# Patient Record
Sex: FEMALE | Race: WHITE
[De-identification: names, ages, dates, MRNs, and addresses within clinical notes are randomized per-mention and may not be internally consistent; named-entity substitution may affect disease eponyms.]

---

## 2018-03-24 ENCOUNTER — HOSPITAL ENCOUNTER (OUTPATIENT)
Dept: HOSPITAL 76 - DI | Age: 43
Discharge: HOME | End: 2018-03-24
Attending: NURSE PRACTITIONER
Payer: OTHER GOVERNMENT

## 2018-03-24 DIAGNOSIS — R10.11: Primary | ICD-10-CM

## 2018-03-24 DIAGNOSIS — Z90.49: ICD-10-CM

## 2018-03-24 PROCEDURE — 76705 ECHO EXAM OF ABDOMEN: CPT

## 2018-03-25 NOTE — ULTRASOUND REPORT
EXAM:

ABDOMEN ULTRASOUND LIMITED, RUQ

 

EXAM DATE: 3/24/2018 09:06 AM.

 

CLINICAL HISTORY: Intermittent right upper quadrant pain x 6-12 months.

 

COMPARISON: 12/12/2008.

 

TECHNIQUE: Real-time scanning was performed with static images obtained. 

 

FINDINGS: 

Liver: Mildly heterogeneous liver parenchyma. No mass or intrahepatic bile duct dilation is noted. 17
.4 cm. Main portal vein flow: Hepatopetal.

 

Gallbladder: Surgically absent. 

 

Biliary System: CBD measures 6.8 mm. No intrahepatic or extrahepatic ductal dilatation. 

 

Other: Right kidney measures 10.9 cm in length. No hydronephrosis. 

 

IMPRESSION: 

1. Gallbladder is surgically absent. Normal common bile duct. 

2. Mildly heterogeneous and coarse liver parenchyma could represent fatty infiltration. No mass.

 

[facility]

Referring Provider Line: [phone]

 

SITE ID: [id number]

## 2018-08-07 ENCOUNTER — HOSPITAL ENCOUNTER (EMERGENCY)
Dept: HOSPITAL 76 - ED | Age: 43
Discharge: HOME | End: 2018-08-07
Payer: OTHER GOVERNMENT

## 2018-08-07 VITALS — DIASTOLIC BLOOD PRESSURE: 84 MMHG | SYSTOLIC BLOOD PRESSURE: 127 MMHG

## 2018-08-07 DIAGNOSIS — R10.11: Primary | ICD-10-CM

## 2018-08-07 LAB
ALBUMIN DIAFP-MCNC: 4 G/DL (ref 3.2–5.5)
ALBUMIN/GLOB SERPL: 1.3 {RATIO} (ref 1–2.2)
ALP SERPL-CCNC: 75 IU/L (ref 42–121)
ALT SERPL W P-5'-P-CCNC: 29 IU/L (ref 10–60)
ANION GAP SERPL CALCULATED.4IONS-SCNC: 6 MMOL/L (ref 6–13)
AST SERPL W P-5'-P-CCNC: 24 IU/L (ref 10–42)
BASOPHILS NFR BLD AUTO: 0.1 10^3/UL (ref 0–0.1)
BASOPHILS NFR BLD AUTO: 0.8 %
BILIRUB BLD-MCNC: 0.7 MG/DL (ref 0.2–1)
BUN SERPL-MCNC: 12 MG/DL (ref 6–20)
CALCIUM UR-MCNC: 9 MG/DL (ref 8.5–10.3)
CHLORIDE SERPL-SCNC: 107 MMOL/L (ref 101–111)
CLARITY UR REFRACT.AUTO: CLEAR
CO2 SERPL-SCNC: 24 MMOL/L (ref 21–32)
CREAT SERPLBLD-SCNC: 0.7 MG/DL (ref 0.4–1)
EOSINOPHIL # BLD AUTO: 0.2 10^3/UL (ref 0–0.7)
EOSINOPHIL NFR BLD AUTO: 2.3 %
ERYTHROCYTE [DISTWIDTH] IN BLOOD BY AUTOMATED COUNT: 14.1 % (ref 12–15)
GFRSERPLBLD MDRD-ARVRAT: 91 ML/MIN/{1.73_M2} (ref 89–?)
GLOBULIN SER-MCNC: 3 G/DL (ref 2.1–4.2)
GLUCOSE SERPL-MCNC: 91 MG/DL (ref 70–100)
GLUCOSE UR QL STRIP.AUTO: NEGATIVE MG/DL
HGB UR QL STRIP: 12.3 G/DL (ref 12–16)
KETONES UR QL STRIP.AUTO: NEGATIVE MG/DL
LIPASE SERPL-CCNC: 31 U/L (ref 22–51)
LYMPHOCYTES # SPEC AUTO: 1.9 10^3/UL (ref 1.5–3.5)
LYMPHOCYTES NFR BLD AUTO: 27.7 %
MCH RBC QN AUTO: 27.2 PG (ref 27–31)
MCHC RBC AUTO-ENTMCNC: 33 G/DL (ref 32–36)
MCV RBC AUTO: 82.4 FL (ref 81–99)
MONOCYTES # BLD AUTO: 0.5 10^3/UL (ref 0–1)
MONOCYTES NFR BLD AUTO: 6.9 %
NEUTROPHILS # BLD AUTO: 4.2 10^3/UL (ref 1.5–6.6)
NEUTROPHILS # SNV AUTO: 6.7 X10^3/UL (ref 4.8–10.8)
NEUTROPHILS NFR BLD AUTO: 62.3 %
NITRITE UR QL STRIP.AUTO: NEGATIVE
PDW BLD AUTO: 6.6 FL (ref 7.9–10.8)
PH UR STRIP.AUTO: 6 PH (ref 5–7.5)
PLATELET # BLD: 310 10^3/UL (ref 130–450)
PROT SPEC-MCNC: 7 G/DL (ref 6.7–8.2)
PROT UR STRIP.AUTO-MCNC: NEGATIVE MG/DL
RBC # UR STRIP.AUTO: NEGATIVE /UL
RBC MAR: 4.53 10^6/UL (ref 4.2–5.4)
SODIUM SERPLBLD-SCNC: 137 MMOL/L (ref 135–145)
SP GR UR STRIP.AUTO: 1.01 (ref 1–1.03)
UROBILINOGEN UR QL STRIP.AUTO: (no result) E.U./DL
UROBILINOGEN UR STRIP.AUTO-MCNC: NEGATIVE MG/DL

## 2018-08-07 PROCEDURE — 80053 COMPREHEN METABOLIC PANEL: CPT

## 2018-08-07 PROCEDURE — 36415 COLL VENOUS BLD VENIPUNCTURE: CPT

## 2018-08-07 PROCEDURE — 85025 COMPLETE CBC W/AUTO DIFF WBC: CPT

## 2018-08-07 PROCEDURE — 87086 URINE CULTURE/COLONY COUNT: CPT

## 2018-08-07 PROCEDURE — 81003 URINALYSIS AUTO W/O SCOPE: CPT

## 2018-08-07 PROCEDURE — 83690 ASSAY OF LIPASE: CPT

## 2018-08-07 PROCEDURE — 81001 URINALYSIS AUTO W/SCOPE: CPT

## 2018-08-07 PROCEDURE — 99283 EMERGENCY DEPT VISIT LOW MDM: CPT

## 2018-08-07 NOTE — ED PHYSICIAN DOCUMENTATION
PD HPI ABD PAIN





- Stated complaint


Stated Complaint: ABD PX





- Chief complaint


Chief Complaint: Abd Pain





- History obtained from


History obtained from: Patient





- History of Present Illness


Timing - onset: How many days ago (2)


Timing - duration: Days (2)


Timing - details: Gradual onset (she had tripped and twisted while carrying 

moderately heavy object earlier and then noted pain upper abd in evening. The 

pain has persisted. Not hurting to eat. No change with antacid. Pain worse with 

movement and palpation. Had CCY in past and concerned about bile duct blockage 

or pancreatic problem.), Still present


Quality: Aching, Dull, Pain.  No: Cramping


Location: RUQ, Epigastric


Improved by: Laying still.  No: Eating


Worsened by: Moving, Breathing, Palpation.  No: Eating


Associated symptoms: No: Fever, Nausea, Vomiting, Diarrhea, Chest pain, Near 

syncope / syncope


Similar symptoms before: Diagnosis (feels similar to gallbladder pains from the 

past)


Recently seen: Not recently seen





Review of Systems


Constitutional: denies: Fever, Chills


Nose: denies: Rhinorrhea / runny nose, Congestion


Throat: denies: Sore throat


Cardiac: denies: Chest pain / pressure


Respiratory: denies: Cough


GI: reports: Abdominal Pain.  denies: Nausea, Vomiting, Diarrhea


: denies: Dysuria, Frequency


Skin: denies: Rash, Lesions


Musculoskeletal: denies: Neck pain, Back pain





PD PAST MEDICAL HISTORY





- Past Medical History


Respiratory: Asthma


Derm: Eczema





- Past Surgical History


Past Surgical History: Yes


General: Cholecystectomy


/GYN:  section, Tubal ligation





- Present Medications


Home Medications: 


 Ambulatory Orders











 Medication  Instructions  Recorded  Confirmed


 


Albuterol Sulfate 1.25 mg IH PRN 08/14/13 06/09/15


 


Fluticasone 44 Mcg [Flovent] 2 puffs INH BID 08/14/13 06/09/15


 


HYDROcod/ACETAM 5/325 [Vicodin 1 - 2 ea PO Q6H PRN #20 tablet 08/14/13 06/09/15





5/325]   


 


Azithromycin [Zithromax] 250 mg PO DAILY #6 tablet 06/09/15 


 


Ipratropium/Albuterol [Duoneb] 3 ml INH Q6H PRN #30 units 06/09/15 


 


Loratadine [Claritin] 1 tab DAILY 06/09/15 06/09/15


 


guaiFENesin [Mucinex] 600 mg PRN 06/09/15 06/09/15


 


predniSONE [Deltasone] 60 mg PO DAILY 06/09/15 06/09/15














- Allergies


Allergies/Adverse Reactions: 


 Allergies











Allergy/AdvReac Type Severity Reaction Status Date / Time


 


Iodinated Contrast- Oral and Allergy Severe seizure Verified 18 13:39





IV Dye     





[Iodinated Contrast Media -     





IV Dye]     


 


NSAIDS (Non-Steroidal Allergy Severe Respiratory Verified 18 13:39





Anti-Inflamma     


 


Sulfa (Sulfonamide Allergy Unknown unknown Verified 18 13:39





Antibiotics)     


 


Penicillins AdvReac Intermediate "get Verified 18 13:39





   really  





   sick"  


 


latex AdvReac Mild blister Verified 18 13:39














- Social History


Does the pt smoke?: No


Smoking Status: Never smoker


Does the pt drink ETOH?: No


Does the pt have substance abuse?: No





PD ED PE NORMAL





- Vitals


Vital signs reviewed: Yes





- General


General: Alert and oriented X 3, No acute distress, Well developed/nourished





- HEENT


HEENT: Pharynx benign





- Neck


Neck: Supple, no meningeal sign, No adenopathy





- Cardiac


Cardiac: RRR, No murmur





- Respiratory


Respiratory: Clear bilaterally





- Abdomen


Abdomen: Normal bowel sounds, Soft, Non distended, No organomegaly, Other (

tender RUQ and epigastric without guarding nor percussion tenderness. )





- Female 


Female : Deferred





- Rectal


Rectal: Deferred





- Back


Back: No CVA TTP





- Derm


Derm: Normal color, Warm and dry





- Extremities


Extremities: No tenderness to palpate, Normal ROM s pain, No edema, No calf 

tenderness / cord





- Neuro


Neuro: Alert and oriented X 3, No motor deficit, Normal speech





Results





- Vitals


Vitals: 


 Vital Signs - 24 hr











  18





  13:36 15:35 17:27


 


Temperature 36.9 C 37.0 C 


 


Heart Rate 90 89 90


 


Respiratory 20 20 16





Rate   


 


Blood Pressure 124/80 121/75 127/84 H


 


O2 Saturation 98 87 L 98








 Oxygen











O2 Source                      Room air

















- Labs


Labs: 


 Laboratory Tests











  18





  14:30 15:19 15:19


 


WBC   6.7 


 


RBC   4.53 


 


Hgb   12.3 


 


Hct   37.3 


 


MCV   82.4 


 


MCH   27.2 


 


MCHC   33.0 


 


RDW   14.1 


 


Plt Count   310 


 


MPV   6.6 L 


 


Neut # (Auto)   4.2 


 


Lymph # (Auto)   1.9 


 


Mono # (Auto)   0.5 


 


Eos # (Auto)   0.2 


 


Baso # (Auto)   0.1 


 


Absolute Nucleated RBC   0.00 


 


Nucleated RBC %   0.0 


 


Sodium    137


 


Potassium    4.0


 


Chloride    107


 


Carbon Dioxide    24


 


Anion Gap    6.0


 


BUN    12


 


Creatinine    0.7


 


Estimated GFR (MDRD)    91


 


Glucose    91


 


Calcium    9.0


 


Total Bilirubin    0.7


 


AST    24


 


ALT    29


 


Alkaline Phosphatase    75


 


Total Protein    7.0


 


Albumin    4.0


 


Globulin    3.0


 


Albumin/Globulin Ratio    1.3


 


Lipase    31


 


Urine Color  YELLOW  


 


Urine Clarity  CLEAR  


 


Urine pH  6.0  


 


Ur Specific Gravity  1.015  


 


Urine Protein  NEGATIVE  


 


Urine Glucose (UA)  NEGATIVE  


 


Urine Ketones  NEGATIVE  


 


Urine Occult Blood  NEGATIVE  


 


Urine Nitrite  NEGATIVE  


 


Urine Bilirubin  NEGATIVE  


 


Urine Urobilinogen  0.2 (NORMAL)  


 


Ur Leukocyte Esterase  NEGATIVE  


 


Ur Microscopic Review  NOT INDICATED  


 


Urine Culture Comments  NOT INDICATED  














Procedures





- Bedside sono


Bedside sono by EMP: 





Gallbladder sulcus without gallbladder c/w history. CBD found and measures 

3.7mm. No free fluid seen. 





PD MEDICAL DECISION MAKING





- ED course


Complexity details: reviewed results, considered differential (labs good and 

bedside U/S showing normal CBD. She has components of the pain that seem 

myofascial. Consider some prior adhesions causing pain/etc. ), d/w patient





- Sepsis Event


Vital Signs: 


 Vital Signs - 24 hr











  18





  13:36 15:35 17:27


 


Temperature 36.9 C 37.0 C 


 


Heart Rate 90 89 90


 


Respiratory 20 20 16





Rate   


 


Blood Pressure 124/80 121/75 127/84 H


 


O2 Saturation 98 87 L 98








 Oxygen











O2 Source                      Room air

















Departure





- Departure


Disposition: 01 Home, Self Care


Clinical Impression: 


Abdominal pain


Qualifiers:


 Abdominal location: right upper quadrant Qualified Code(s): R10.11 - Right 

upper quadrant pain





Condition: Stable


Record reviewed to determine appropriate education?: Yes


Instructions:  ED Abdominal Pain Unkn Cause


Follow-Up: 


Jackie Enrique ARNP [Primary Care Provider] - 


Comments: 


Drink lots of fluids.  Use some ibuprofen or naproxen to 3 times daily and add 

Tylenol if needed for pains. 





Right now there is no signs of significant cause of the pain.  Your blood tests 

are normal.  Your common bile duct appears normal size.  It does not sound like 

ulcer.  This may be presumably myofascial and see if it improves over a few a 

few days with some anti-inflammatories.  Activity as able.  





Recheck if not better over the next few days or if other symptoms develop.


Discharge Date/Time: 18 17:27

## 2019-08-29 ENCOUNTER — HOSPITAL ENCOUNTER (OUTPATIENT)
Dept: HOSPITAL 76 - DI | Age: 44
Discharge: HOME | End: 2019-08-29
Attending: NURSE PRACTITIONER
Payer: OTHER GOVERNMENT

## 2019-08-29 DIAGNOSIS — R10.32: Primary | ICD-10-CM

## 2019-08-29 PROCEDURE — 76830 TRANSVAGINAL US NON-OB: CPT

## 2019-08-29 PROCEDURE — 76856 US EXAM PELVIC COMPLETE: CPT

## 2019-08-29 NOTE — ULTRASOUND REPORT
Reason:  LEFT LOWER QUADRANT PAIN

Procedure Date:  2019   

Accession Number:  [id number] / [id number]                    

Procedure:  US  - Pelvic w/Transvaginal CPT Code:  

 

FULL RESULT:

 

 

EXAM:

PELVIC ULTRASOUND

 

EXAM DATE: 2019 04:34 PM.

 

CLINICAL HISTORY: LEFT LOWER QUADRANT PAIN. Rule out ovarian cyst. 

43-year-old  female with LMP 2019.

 

COMPARISON: Report of CT abdomen and pelvis 10/09/2010.

 

TECHNIQUE: Realtime transabdominal pelvic scan performed with static 

image documentation. Patient refused transvaginal scan.

 

FINDINGS:

Uterus: 11.7 x 4.4 x 5.9 cm, volume 157 cc. Anteverted position. Normal 

overall size and echotexture.

Masses: 2.3 x 2.2 x 1.6 cm apparent left anterior fundal subserosal 

fibroid, not well visualized.

Endometrium: 9 mm. Not well visualized.

Cervix: Obscured by bowel gas.

 

Right Ovary: 2.3 x 1.9 x 2.2 cm, volume 5.0 cc. Normal echotexture. 1.1 x 

0.7 x 1.3 cm follicle.

Left Ovary: 2.5 x 2.0 x 3.6 cm, volume 13 cc. Normal echotexture. 

Multiple simple follicles with the largest measuring 1.7 x 1.7 x 1.9 cm.

 

Free Fluid: None.

 

Other: None.

 

IMPRESSION: Transabdominal-only pelvic ultrasound without clear findings 

to explain left-sided pain. Normal-sized ovaries with simple follicles 

the largest of which is on the left measuring 0.9 cm. No free fluid is 

identified.

 

DOUG

 

The call report notification system was initiated by Dr. Larry Masterson at 

06:02 PM on 2019.

ADDENDUM: 19 18:04

 

The above call report findings  were discussed with Azucena Higgins by 

Dr. Larry Masterson at 06:04 PM on 2019.

## 2020-01-24 ENCOUNTER — HOSPITAL ENCOUNTER (OUTPATIENT)
Dept: HOSPITAL 76 - DI.S | Age: 45
Discharge: HOME | End: 2020-01-24
Attending: NURSE PRACTITIONER
Payer: OTHER GOVERNMENT

## 2020-01-24 DIAGNOSIS — R05: Primary | ICD-10-CM

## 2020-01-24 PROCEDURE — 71046 X-RAY EXAM CHEST 2 VIEWS: CPT

## 2020-01-24 NOTE — XRAY REPORT
Reason:  COUGH

Procedure Date:  01/24/2020   

Accession Number:  [id number] / [id number]                    

Procedure:  XRS - Chest 2 View X-Ray CPT Code:  55202

 

***Final Report***

 

 

FULL RESULT:

 

 

EXAM:

CHEST RADIOGRAPHY 2 VIEWS

 

EXAM DATE: 1/24/2020.

 

CLINICAL HISTORY: Cough.

 

COMPARISON: PA and lateral chest on 06/09/2015.

 

TECHNIQUE: PA and lateral views.

 

FINDINGS:

Lungs/Pleura: Normal vasculature. The lungs are clear. No pleural fluid 

or pneumothorax.

 

Mediastinum: Normal cardiac and mediastinal contours. Epicardial fat pad 

adjacent to the left cardiac apex.

 

Bones: Mild degenerative changes of the spine.

IMPRESSION: Normal PA and lateral chest radiography.

 

[facility]

## 2022-05-16 ENCOUNTER — HOSPITAL ENCOUNTER (OUTPATIENT)
Dept: HOSPITAL 76 - DI.S | Age: 47
Discharge: HOME | End: 2022-05-16
Attending: EMERGENCY MEDICINE
Payer: OTHER GOVERNMENT

## 2022-05-16 DIAGNOSIS — J18.9: Primary | ICD-10-CM

## 2022-05-17 NOTE — XRAY REPORT
PROCEDURE:  Chest 2 View X-Ray

 

INDICATIONS:  RIGHT UPPER ZONE PNEUMONIA

 

TECHNIQUE:  2 view(s) of the chest.  

 

COMPARISON:  1/24/2020

 

FINDINGS:  

 

Surgical changes and devices:  None.  

 

Lungs and pleura:  No pleural effusions or pneumothorax.  Lungs are clear.  

 

Mediastinum:  Mediastinal contours are normal.  Heart size is normal.  

 

Bones and chest wall:  No suspicious bony abnormalities.  Soft tissues appear unremarkable.  

 

IMPRESSION:  Chest without acute cardiopulmonary abnormalities. No focal airspace disease.

 

Reviewed by: Royce Stone MD on 5/17/2022 8:59 AM PDT

Approved by: Royce Stone MD on 5/17/2022 8:59 AM PDT

 

 

Station ID:  [id number]

## 2023-02-03 ENCOUNTER — HOSPITAL ENCOUNTER (OUTPATIENT)
Dept: HOSPITAL 76 - LAB.S | Age: 48
Discharge: HOME | End: 2023-02-03
Attending: PHYSICIAN ASSISTANT
Payer: OTHER GOVERNMENT

## 2023-02-03 DIAGNOSIS — N76.0: ICD-10-CM

## 2023-02-03 DIAGNOSIS — R30.0: Primary | ICD-10-CM

## 2023-02-03 LAB
BV DNA PNL VAG NAA+PROBE: POSITIVE
C GLABRATA DNA BLD QL NAA+PROBE: NEGATIVE
C KRUSEI DNA VAG QL NAA+PROBE: NEGATIVE
C TRACH DNA SPEC NAA+PROBE-ACNC: NEGATIVE
CANDIDA DNA VAG QL NAA+PROBE: POSITIVE
N GONORRHOEA DNA GENITAL QL NAA+PROBE: NEGATIVE
T VAGINALIS RRNA GENITAL QL PROBE: (no result)
T VAGINALIS RRNA GENITAL QL PROBE: NEGATIVE

## 2023-02-03 PROCEDURE — 87077 CULTURE AEROBIC IDENTIFY: CPT

## 2023-02-03 PROCEDURE — 87181 SC STD AGAR DILUTION PER AGT: CPT

## 2023-02-03 PROCEDURE — 87661 TRICHOMONAS VAGINALIS AMPLIF: CPT

## 2023-02-03 PROCEDURE — 81514 NFCT DS BV&VAGINITIS DNA ALG: CPT

## 2023-02-03 PROCEDURE — 87086 URINE CULTURE/COLONY COUNT: CPT

## 2023-02-03 PROCEDURE — 87591 N.GONORRHOEAE DNA AMP PROB: CPT

## 2023-02-03 PROCEDURE — 87491 CHLMYD TRACH DNA AMP PROBE: CPT
